# Patient Record
Sex: FEMALE | HISPANIC OR LATINO | URBAN - METROPOLITAN AREA
[De-identification: names, ages, dates, MRNs, and addresses within clinical notes are randomized per-mention and may not be internally consistent; named-entity substitution may affect disease eponyms.]

---

## 2018-03-22 ENCOUNTER — APPOINTMENT (RX ONLY)
Dept: URBAN - METROPOLITAN AREA CLINIC 169 | Facility: CLINIC | Age: 28
Setting detail: DERMATOLOGY
End: 2018-03-22

## 2018-03-22 DIAGNOSIS — L91.0 HYPERTROPHIC SCAR: ICD-10-CM

## 2018-03-22 PROCEDURE — ? INTRALESIONAL KENALOG

## 2018-03-22 PROCEDURE — ? COUNSELING

## 2018-03-22 PROCEDURE — 11900 INJECT SKIN LESIONS </W 7: CPT

## 2018-03-22 PROCEDURE — ? PATIENT SPECIFIC COUNSELING

## 2018-03-22 ASSESSMENT — LOCATION ZONE DERM: LOCATION ZONE: EAR

## 2018-03-22 ASSESSMENT — LOCATION DETAILED DESCRIPTION DERM
LOCATION DETAILED: RIGHT INFERIOR HELIX
LOCATION DETAILED: RIGHT ANTITRAGUS

## 2018-03-22 ASSESSMENT — LOCATION SIMPLE DESCRIPTION DERM: LOCATION SIMPLE: RIGHT EAR

## 2018-03-22 NOTE — PROCEDURE: PATIENT SPECIFIC COUNSELING
Discussed option of ILK vs excision followed by superficial radiation or ILK. Patient elects to start with ILK and will avoid future ear piercings.
Detail Level: Detailed

## 2018-03-22 NOTE — PROCEDURE: INTRALESIONAL KENALOG
Expiration Date (Optional): March 2019
Total Volume Injected (Ccs- Only Use Numbers And Decimals): 0.1
Include Z78.9 (Other Specified Conditions Influencing Health Status) As An Associated Diagnosis?: No
Consent: The risks of atrophy, dyspigmentation, scarring, and pain were reviewed with the patient and verbal consent was obtained.
Detail Level: Detailed
Medical Necessity Clause: This procedure was medically necessary because the lesions that were treated were:
X Size Of Lesion In Cm (Optional): 0
Concentration Of Solution Injected (Mg/Ml): 40.0
Kenalog Preparation: Kenalog
Lot # (Optional): [id number]

## 2018-04-12 ENCOUNTER — APPOINTMENT (RX ONLY)
Dept: URBAN - METROPOLITAN AREA CLINIC 169 | Facility: CLINIC | Age: 28
Setting detail: DERMATOLOGY
End: 2018-04-12

## 2018-04-12 DIAGNOSIS — L91.0 HYPERTROPHIC SCAR: ICD-10-CM

## 2018-04-12 PROCEDURE — ? COUNSELING

## 2018-04-12 PROCEDURE — ? PATIENT SPECIFIC COUNSELING

## 2018-04-12 PROCEDURE — ? INTRALESIONAL KENALOG

## 2018-04-12 PROCEDURE — 11900 INJECT SKIN LESIONS </W 7: CPT

## 2018-04-12 ASSESSMENT — LOCATION DETAILED DESCRIPTION DERM
LOCATION DETAILED: RIGHT INFERIOR HELIX
LOCATION DETAILED: RIGHT ANTITRAGUS

## 2018-04-12 ASSESSMENT — LOCATION ZONE DERM: LOCATION ZONE: EAR

## 2018-04-12 ASSESSMENT — LOCATION SIMPLE DESCRIPTION DERM: LOCATION SIMPLE: RIGHT EAR

## 2018-04-12 NOTE — PROCEDURE: INTRALESIONAL KENALOG
Ndc# For Kenalog Only: [id number]
Kenalog Preparation: Kenalog
Detail Level: Detailed
X Size Of Lesion In Cm (Optional): 0
Consent: The risks of atrophy, dyspigmentation, scarring, and pain were reviewed with the patient and verbal consent was obtained.
Include Z78.9 (Other Specified Conditions Influencing Health Status) As An Associated Diagnosis?: No
Medical Necessity Clause: This procedure was medically necessary because the lesions that were treated were:
Expiration Date (Optional): January 2019
Concentration Of Solution Injected (Mg/Ml): 40.0
Lot # (Optional): [id number]
Total Volume Injected (Ccs- Only Use Numbers And Decimals): 0.15
Treatment Number (Optional): 2

## 2018-05-03 ENCOUNTER — APPOINTMENT (RX ONLY)
Dept: URBAN - METROPOLITAN AREA CLINIC 169 | Facility: CLINIC | Age: 28
Setting detail: DERMATOLOGY
End: 2018-05-03

## 2018-05-03 DIAGNOSIS — L91.0 HYPERTROPHIC SCAR: ICD-10-CM

## 2018-05-03 PROCEDURE — ? INTRALESIONAL KENALOG

## 2018-05-03 PROCEDURE — ? PATIENT SPECIFIC COUNSELING

## 2018-05-03 PROCEDURE — 11900 INJECT SKIN LESIONS </W 7: CPT

## 2018-05-03 PROCEDURE — ? COUNSELING

## 2018-05-03 ASSESSMENT — LOCATION SIMPLE DESCRIPTION DERM: LOCATION SIMPLE: RIGHT EAR

## 2018-05-03 ASSESSMENT — LOCATION DETAILED DESCRIPTION DERM
LOCATION DETAILED: RIGHT INFERIOR HELIX
LOCATION DETAILED: RIGHT ANTITRAGUS

## 2018-05-03 ASSESSMENT — LOCATION ZONE DERM: LOCATION ZONE: EAR

## 2018-05-03 NOTE — PROCEDURE: PATIENT SPECIFIC COUNSELING
No longer with any pruritus.  Elected to do one more treatment to help thin lesion out and then will follow up as needed.
Detail Level: Detailed

## 2018-05-03 NOTE — PROCEDURE: INTRALESIONAL KENALOG
Treatment Number (Optional): 3
Consent: The risks of atrophy, dyspigmentation, scarring, and pain were reviewed with the patient and verbal consent was obtained.
Kenalog Preparation: Kenalog
Total Volume Injected (Ccs- Only Use Numbers And Decimals): 0.15
Concentration Of Solution Injected (Mg/Ml): 40.0
Include Z78.9 (Other Specified Conditions Influencing Health Status) As An Associated Diagnosis?: No
X Size Of Lesion In Cm (Optional): 0
Detail Level: Detailed
Medical Necessity Clause: This procedure was medically necessary because the lesions that were treated were: